# Patient Record
Sex: MALE | Race: WHITE | Employment: OTHER | URBAN - METROPOLITAN AREA
[De-identification: names, ages, dates, MRNs, and addresses within clinical notes are randomized per-mention and may not be internally consistent; named-entity substitution may affect disease eponyms.]

---

## 2018-11-16 ENCOUNTER — OFFICE VISIT (OUTPATIENT)
Dept: INTERNAL MEDICINE CLINIC | Age: 73
End: 2018-11-16

## 2018-11-16 VITALS
TEMPERATURE: 97.9 F | HEART RATE: 56 BPM | WEIGHT: 180 LBS | RESPIRATION RATE: 16 BRPM | OXYGEN SATURATION: 98 % | HEIGHT: 70 IN | BODY MASS INDEX: 25.77 KG/M2 | DIASTOLIC BLOOD PRESSURE: 84 MMHG | SYSTOLIC BLOOD PRESSURE: 130 MMHG

## 2018-11-16 DIAGNOSIS — G47.9 SLEEP DISTURBANCE: ICD-10-CM

## 2018-11-16 DIAGNOSIS — Z87.39 H/O: GOUT: ICD-10-CM

## 2018-11-16 DIAGNOSIS — I10 HYPERTENSION, ESSENTIAL: Primary | ICD-10-CM

## 2018-11-16 DIAGNOSIS — K21.9 GASTROESOPHAGEAL REFLUX DISEASE WITHOUT ESOPHAGITIS: ICD-10-CM

## 2018-11-16 RX ORDER — FEBUXOSTAT 40 MG/1
20 TABLET ORAL DAILY
COMMUNITY
Start: 2018-09-25 | End: 2019-03-25 | Stop reason: SDUPTHER

## 2018-11-16 RX ORDER — LISINOPRIL 20 MG/1
20 TABLET ORAL DAILY
COMMUNITY
Start: 2018-10-05 | End: 2019-03-25 | Stop reason: SDUPTHER

## 2018-11-16 RX ORDER — RANITIDINE HCL 75 MG
75 TABLET ORAL DAILY
COMMUNITY
End: 2019-12-10

## 2018-11-16 RX ORDER — BISMUTH SUBSALICYLATE 262 MG
1 TABLET,CHEWABLE ORAL DAILY
COMMUNITY

## 2018-11-16 RX ORDER — MELATONIN
DAILY
COMMUNITY

## 2018-11-16 NOTE — PROGRESS NOTES
Shanna Palencia is a 72y.o. year old male who is a new patient to me today (11/16/18). He was previous followed by PCP in [place]. 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 He goes by \"Brayden\". Assessment & Plan:  
Diagnoses and all orders for this visit: 
 
1. Hypertension, essential- new dx to me, chronic problem, well controlled, continue current treatment, will get previous PCP records & labs for review. 2. H/O: gout- new dx to me, chronic problem, asymptomatic, no medication changes pending review of PCP records/labs 3. Gastroesophageal reflux disease without esophagitis- new dx to me, chronic problem, well controlled, need to get previous PCP records, not sure he was ever symptomatic and just using for prophylaxis, since not a PPI will continue for this time. 4. Sleep disturbance- new dx in the last year, not ideally controlled, reviewed sleep hygiene, reviewed OTC and Rx meds I would recommend, will start w/ melatonin and consider trazadone. Follow-up Disposition: 
Return in about 4 months (around 3/16/2019) for FULL PHYSICAL - 30 minutes. Subjective:  
Ashvin Morton was seen today for Establish Care Cardiovascular Review The patient has hypertension. He has been on medication for 1-2 yrs. He reports taking medications as instructed, no medication side effects noted on a regular basis (rare cough). Diet and Lifestyle: generally follows a low fat low cholesterol diet, generally follows a low sodium diet, exercises regularly. Labs: no lab studies available for review at time of visit. He reports labs were checked 4 months ago. Endocrine Review He is seen for gout. He reports no recent flares. He reports medication compliance: compliant all of the time. Medication side effects: none. Diet and Lifestyle: generally follows a good diet. no lab studies available for review at time of visit. GI Review He has a history of GERD and hiatal hernia. He reports symptoms are well controlled and never really had symptoms. He was told he had a hiatal hernia. He denies: abdominal bloating, heartburn, midepigastric pain and nausea. Medical therapy currently involves Zantac. The following sections were reviewed & updated as appropriate: PMH, PL, PSH, FH, and SH. Patient Active Problem List  
Diagnosis Code  Hypertension, essential I10  
 Gastroesophageal reflux disease without esophagitis K21.9  
 H/O: gout Z87.39 Prior to Admission medications Medication Sig Start Date End Date Taking? Authorizing Provider ULORIC 40 mg tab tablet Take 20 mg by mouth daily. 9/25/18  Yes Provider, Historical  
lisinopril (PRINIVIL, ZESTRIL) 20 mg tablet Take 20 mg by mouth daily. 10/5/18  Yes Provider, Historical  
raNITIdine (ZANTAC) 75 mg tab Take 75 mg by mouth daily. Yes Provider, Historical  
multivitamin (ONE A DAY) tablet Take 1 Tab by mouth daily. Yes Provider, Historical  
Omega-3 Fatty Acids (FISH OIL) 500 mg cap Take 1 Cap by mouth daily. Yes Provider, Historical  
cholecalciferol (VITAMIN D3) 1,000 unit tablet Take  by mouth daily. Yes Provider, Historical  
  
 
 
Allergies no known allergies Review of Systems Constitutional: Negative for malaise/fatigue and weight loss. Respiratory: Negative for cough and shortness of breath. Cardiovascular: Negative for chest pain, palpitations and leg swelling. Gastrointestinal: Negative for abdominal pain, constipation, diarrhea, heartburn, nausea and vomiting. Genitourinary: Negative for frequency. Musculoskeletal: Negative for joint pain and myalgias. Skin: Negative for rash. Neurological: Negative for tingling, sensory change, focal weakness and headaches. Psychiatric/Behavioral: Negative for depression. The patient has insomnia. The patient is not nervous/anxious. Insomnia- he reports trouble staying asleep, no issues falling a sleep, has been an issue for the last 1-2 years. He has d/w his previous PCP and did not recommend any changes Objective:  
Physical Exam  
Constitutional: No distress. HENT:  
Mouth/Throat: Mucous membranes are normal.  
Eyes: Conjunctivae are normal. No scleral icterus. Neck: Neck supple. Cardiovascular: Regular rhythm and normal heart sounds. No murmur heard. Pulmonary/Chest: Effort normal and breath sounds normal. He has no wheezes. He has no rales. Abdominal: Soft. Bowel sounds are normal. He exhibits no mass. There is no hepatosplenomegaly. There is no tenderness. Musculoskeletal: He exhibits no edema. Lymphadenopathy:  
  He has no cervical adenopathy. Skin: No rash noted. Psychiatric: He has a normal mood and affect. His behavior is normal.  
  
 
 
Visit Vitals /84 Pulse (!) 56 Temp 97.9 °F (36.6 °C) (Oral) Resp 16 Ht 5' 9.5\" (1.765 m) Wt 180 lb (81.6 kg) SpO2 98% BMI 26.20 kg/m² Disclaimer: 
Advised him to call back or return to office if symptoms worsen/change/persist. 
Discussed expected course/resolution/complications of diagnosis in detail with patient. Medication risks/benefits/costs/interactions/alternatives discussed with patient. He was given an after visit summary which includes diagnoses, current medications, & vitals. He expressed understanding with the diagnosis and plan. Aspects of this note may have been generated using voice recognition software. Despite editing, there may be some syntax errors.   
 
 
Conchita Chapin MD

## 2019-03-25 ENCOUNTER — TELEPHONE (OUTPATIENT)
Dept: INTERNAL MEDICINE CLINIC | Age: 74
End: 2019-03-25

## 2019-03-25 RX ORDER — FEBUXOSTAT 40 MG/1
20 TABLET ORAL DAILY
Qty: 30 TAB | Refills: 0 | OUTPATIENT
Start: 2019-03-25

## 2019-03-25 RX ORDER — FEBUXOSTAT 40 MG/1
20 TABLET ORAL DAILY
Status: CANCELLED | OUTPATIENT
Start: 2019-03-25

## 2019-03-25 RX ORDER — LISINOPRIL 20 MG/1
20 TABLET ORAL DAILY
Qty: 90 TAB | Refills: 0 | Status: SHIPPED | OUTPATIENT
Start: 2019-03-25 | End: 2019-04-05 | Stop reason: SDUPTHER

## 2019-03-25 RX ORDER — FEBUXOSTAT 40 MG/1
20 TABLET ORAL DAILY
Qty: 45 TAB | Refills: 0 | Status: SHIPPED | OUTPATIENT
Start: 2019-03-25 | End: 2019-04-05 | Stop reason: SDUPTHER

## 2019-03-25 NOTE — TELEPHONE ENCOUNTER
Patient needs refills on uloric and lisinopril, reminded him he is due for an office visit with Dr. Richie Mcrae,. offered to set up something now he w/c/b. He is out of uloric.

## 2019-03-25 NOTE — TELEPHONE ENCOUNTER
3 months of meds sent in, no further refills until seen in office and needs labs. I have not received previous PCP records, please resent.

## 2019-03-28 ENCOUNTER — OFFICE VISIT (OUTPATIENT)
Dept: INTERNAL MEDICINE CLINIC | Age: 74
End: 2019-03-28

## 2019-03-28 VITALS
TEMPERATURE: 97.6 F | RESPIRATION RATE: 16 BRPM | SYSTOLIC BLOOD PRESSURE: 158 MMHG | HEIGHT: 70 IN | WEIGHT: 181 LBS | OXYGEN SATURATION: 98 % | HEART RATE: 59 BPM | BODY MASS INDEX: 25.91 KG/M2 | DIASTOLIC BLOOD PRESSURE: 82 MMHG

## 2019-03-28 DIAGNOSIS — I10 HYPERTENSION, ESSENTIAL: Primary | ICD-10-CM

## 2019-03-28 DIAGNOSIS — K40.90 LEFT GROIN HERNIA: ICD-10-CM

## 2019-03-28 DIAGNOSIS — Z23 ENCOUNTER FOR IMMUNIZATION: ICD-10-CM

## 2019-03-28 DIAGNOSIS — Z87.39 H/O: GOUT: ICD-10-CM

## 2019-03-28 DIAGNOSIS — Z11.59 NEED FOR HEPATITIS C SCREENING TEST: ICD-10-CM

## 2019-03-28 NOTE — PROGRESS NOTES
Lima Stephen is a 72 y.o. male who was seen in clinic today (3/28/2019). He RTC for 2nd visit, previous PCP records still have not been received. Assessment & Plan:  
 
Diagnoses and all orders for this visit: 
 
1. Hypertension, essential- previously well controlled, unclear why elevated now, has not been checking at home, he will start check amb BP and update me in 2-3 wks, will check labs and not making any changes at this time -     METABOLIC PANEL, COMPREHENSIVE 
-     CBC W/O DIFF 
 
2. H/O: gout- asymptomatic, well controlled, continue current treatment pending review of labs -     METABOLIC PANEL, COMPREHENSIVE 
-     CBC W/O DIFF 
-     URIC ACID 3. Left groin hernia- this is a new problem, he reports this is recurrent, nothing obvious on exam but is concerning, will have him see specialist, reviewed imaging but will defer at this time.  
-     [address] 4. Encounter for immunization 
-     varicella-zoster recombinant, PF, (SHINGRIX, PF,) 50 mcg/0.5 mL susr injection; 0.5 ml IM once and then repeat in 2-6 months. 
-     diph,Pertuss,Acell,,Tet Vac-PF (ADACEL) 2 Lf-(2.5-5-3-5 mcg)-5Lf/0.5 mL susp; 0.5 mL by IntraMUSCular route once for 1 dose. 5. Need for hepatitis C screening test 
-     HEPATITIS C AB Follow-up and Dispositions · Return in about 3 months (around 6/28/2019) for FULL PHYSICAL - 30 minutes. Subjective:  
Rosalva Maldonado was seen today for Hypertension and Gout Cardiovascular Review The patient has hypertension. Since last visit: no changes. He reports taking medications as instructed, no medication side effects noted, home BP monitoring in range of 954-920'L systolic over 76'H diastolic. Diet and Lifestyle: generally follows a low sodium diet, exercises regularly. Labs: no lab studies available for review at time of visit. Endocrine Review He is seen for gout. Since last visit: no recent flares.   He reports medication compliance: compliant all of the time. Medication side effects: none. Diet and Lifestyle: generally follows a good diet. no lab studies available for review at time of visit. Brief Labs:  
No results found for: NA, K, CREA, CREATEXT, CHOL, HDL, LDLC, LDL, LDLCEXT, TRIGL, HBA1C, ZZU0LAND, TSH, [id number], TSHEXT Prior to Admission medications Medication Sig Start Date End Date Taking? Authorizing Provider ULORIC 40 mg tab tablet Take 0.5 Tabs by mouth daily. 3/25/19  Yes Patricio Mckeon MD  
lisinopril (PRINIVIL, ZESTRIL) 20 mg tablet Take 1 Tab by mouth daily. 3/25/19  Yes Patricio Mckeon MD  
raNITIdine (ZANTAC) 75 mg tab Take 75 mg by mouth daily. Yes Provider, Historical  
multivitamin (ONE A DAY) tablet Take 1 Tab by mouth daily. Yes Provider, Historical  
Omega-3 Fatty Acids (FISH OIL) 500 mg cap Take 1 Cap by mouth daily. Yes Provider, Historical  
cholecalciferol (VITAMIN D3) 1,000 unit tablet Take  by mouth daily. Yes Provider, Historical  
  
 
 
No Known Allergies Review of Systems Constitutional: Negative for malaise/fatigue and weight loss. Respiratory: Negative for cough and shortness of breath. Cardiovascular: Negative for chest pain, palpitations and leg swelling. Gastrointestinal: Negative for abdominal pain, constipation, diarrhea, heartburn, nausea and vomiting. He reports h/o hernia repair in the past.  He has had this area repaired in the past, feeling sharp pain at times, similar to previous hernia pains, no obvious triggers Genitourinary: Negative for frequency. He thinks he is urinating more, he is not sure. He reports nocturia 2-3, leakage is stable. No urgency. He is s/p prostatectomy in early 90's Musculoskeletal: Negative for joint pain and myalgias. Skin: Negative for rash. Neurological: Negative for tingling, sensory change, focal weakness and headaches. Psychiatric/Behavioral: Negative for depression. The patient is not nervous/anxious and does not have insomnia. Objective:  
Physical Exam  
Cardiovascular: Regular rhythm and normal heart sounds. No murmur heard. Pulmonary/Chest: Effort normal and breath sounds normal. He has no wheezes. He has no rales. Abdominal: Soft. Bowel sounds are normal. He exhibits no mass. There is no hepatosplenomegaly. There is no tenderness. A hernia is present. Hernia confirmed negative in the left inguinal area. Genitourinary: Circumcised. Lymphadenopathy:  
     Left: No inguinal adenopathy present. Visit Vitals /82 Pulse (!) 59 Temp 97.6 °F (36.4 °C) (Oral) Resp 16 Ht 5' 9.5\" (1.765 m) Wt 181 lb (82.1 kg) SpO2 98% BMI 26.35 kg/m² Disclaimer: 
Advised him to call back or return to office if symptoms worsen/change/persist. 
Discussed expected course/resolution/complications of diagnosis in detail with patient. Medication risks/benefits/costs/interactions/alternatives discussed with patient. He was given an after visit summary which includes diagnoses, current medications, & vitals. He expressed understanding with the diagnosis and plan. Aspects of this note may have been generated using voice recognition software. Despite editing, there may be some syntax errors.   
 
 
Dee Arteaga MD

## 2019-03-29 LAB
ALBUMIN SERPL-MCNC: 4.5 G/DL (ref 3.5–4.8)
ALBUMIN/GLOB SERPL: 2 {RATIO} (ref 1.2–2.2)
ALP SERPL-CCNC: 53 IU/L (ref 39–117)
ALT SERPL-CCNC: 16 IU/L (ref 0–44)
AST SERPL-CCNC: 19 IU/L (ref 0–40)
BILIRUB SERPL-MCNC: 0.5 MG/DL (ref 0–1.2)
BUN SERPL-MCNC: 15 MG/DL (ref 8–27)
BUN/CREAT SERPL: 16 (ref 10–24)
CALCIUM SERPL-MCNC: 9.2 MG/DL (ref 8.6–10.2)
CHLORIDE SERPL-SCNC: 102 MMOL/L (ref 96–106)
CO2 SERPL-SCNC: 24 MMOL/L (ref 20–29)
CREAT SERPL-MCNC: 0.92 MG/DL (ref 0.76–1.27)
ERYTHROCYTE [DISTWIDTH] IN BLOOD BY AUTOMATED COUNT: 14.3 % (ref 12.3–15.4)
GLOBULIN SER CALC-MCNC: 2.2 G/DL (ref 1.5–4.5)
GLUCOSE SERPL-MCNC: 80 MG/DL (ref 65–99)
HCT VFR BLD AUTO: 40 % (ref 37.5–51)
HCV AB S/CO SERPL IA: <0.1 S/CO RATIO (ref 0–0.9)
HGB BLD-MCNC: 13.4 G/DL (ref 13–17.7)
MCH RBC QN AUTO: 30.9 PG (ref 26.6–33)
MCHC RBC AUTO-ENTMCNC: 33.5 G/DL (ref 31.5–35.7)
MCV RBC AUTO: 92 FL (ref 79–97)
PLATELET # BLD AUTO: 222 X10E3/UL (ref 150–379)
POTASSIUM SERPL-SCNC: 5.3 MMOL/L (ref 3.5–5.2)
PROT SERPL-MCNC: 6.7 G/DL (ref 6–8.5)
RBC # BLD AUTO: 4.33 X10E6/UL (ref 4.14–5.8)
SODIUM SERPL-SCNC: 140 MMOL/L (ref 134–144)
URATE SERPL-MCNC: 4.2 MG/DL (ref 3.7–8.6)
WBC # BLD AUTO: 4.5 X10E3/UL (ref 3.4–10.8)

## 2019-03-29 NOTE — PROGRESS NOTES
Letter sent to patient. All labs are stable or at goal for him. K just barely elevated, likely lisinopril related. No past labs to compare to at this time. No changes.

## 2019-04-03 ENCOUNTER — TELEPHONE (OUTPATIENT)
Dept: INTERNAL MEDICINE CLINIC | Age: 74
End: 2019-04-03

## 2019-04-03 RX ORDER — LISINOPRIL 20 MG/1
20 TABLET ORAL DAILY
Qty: 90 TAB | Refills: 0 | OUTPATIENT
Start: 2019-04-03

## 2019-04-03 RX ORDER — FEBUXOSTAT 40 MG/1
20 TABLET ORAL DAILY
Qty: 45 TAB | Refills: 0 | OUTPATIENT
Start: 2019-04-03

## 2019-04-05 RX ORDER — FEBUXOSTAT 40 MG/1
20 TABLET ORAL DAILY
Qty: 45 TAB | Refills: 0 | Status: SHIPPED | OUTPATIENT
Start: 2019-04-05 | End: 2019-06-24 | Stop reason: SDUPTHER

## 2019-04-05 RX ORDER — LISINOPRIL 20 MG/1
20 TABLET ORAL DAILY
Qty: 90 TAB | Refills: 0 | Status: SHIPPED | OUTPATIENT
Start: 2019-04-05 | End: 2019-12-18

## 2019-04-05 NOTE — TELEPHONE ENCOUNTER
Called and spoke with . Lindsay Goltz. Stated he did not receive a 90 script and does not know what happened. It appears that the pharmacy never r'cvd it. Informed him script was sent in again. (Lisinopril 20mg Qday and Uloric 40mg Qday.)  Confirmed that pharmacy was correct. ([facility] on [address] He was appreciative for the quick response.

## 2019-04-05 NOTE — TELEPHONE ENCOUNTER
Please call patient. meds were sent in back in March for 90 days, not sure what happened.   meds have been sent in again

## 2019-04-22 ENCOUNTER — OFFICE VISIT (OUTPATIENT)
Dept: SURGERY | Age: 74
End: 2019-04-22

## 2019-04-22 VITALS
HEART RATE: 59 BPM | RESPIRATION RATE: 18 BRPM | TEMPERATURE: 98.3 F | OXYGEN SATURATION: 97 % | HEIGHT: 70 IN | WEIGHT: 182 LBS | SYSTOLIC BLOOD PRESSURE: 160 MMHG | DIASTOLIC BLOOD PRESSURE: 82 MMHG | BODY MASS INDEX: 26.05 KG/M2

## 2019-04-22 DIAGNOSIS — K40.90 LEFT INGUINAL HERNIA: Primary | ICD-10-CM

## 2019-04-22 NOTE — PROGRESS NOTES
1. Have you been to the ER, urgent care clinic since your last visit? Hospitalized since your last visit? No 
 
2. Have you seen or consulted any other health care providers outside of the [address] [place] [address] since your last visit? Include any pap smears or colon screening.  No

## 2019-04-23 NOTE — PROGRESS NOTES
[employer] General Surgery History and Physical 
 
History of Present Illness:  
  
Jessy Carmichael is a 72 y.o. male who has a new left inguinal hernia. He has had a few hernia repairs on the R side and he cannot remember if he had one repaired on the left or not. He has been having some pain in the L groin and noticed a small bulge in the L side. He does not have any changes in BM. He pain he has is minimal a 1 of 10. Past Medical History:  
Diagnosis Date  Gastroesophageal reflux disease without esophagitis 11/16/2018  H/O: gout 11/16/2018  Hypertension, essential 11/16/2018 Past Surgical History:  
Procedure Laterality Date  HX COLONOSCOPY    
 HX HERNIA REPAIR Bilateral 2000  
 bilateral inguinal x 4  
 HX PARATHYROIDECTOMY [address] [facility], [address] Current Outpatient Medications:  
  lisinopril (PRINIVIL, ZESTRIL) 20 mg tablet, Take 1 Tab by mouth daily. , Disp: 90 Tab, Rfl: 0 
  ULORIC 40 mg tab tablet, Take 0.5 Tabs by mouth daily. , Disp: 45 Tab, Rfl: 0 
  varicella-zoster recombinant, PF, (SHINGRIX, PF,) 50 mcg/0.5 mL susr injection, 0.5 ml IM once and then repeat in 2-6 months., Disp: 0.5 mL, Rfl: 1   raNITIdine (ZANTAC) 75 mg tab, Take 75 mg by mouth daily. , Disp: , Rfl:  
  multivitamin (ONE A DAY) tablet, Take 1 Tab by mouth daily. , Disp: , Rfl:  
  Omega-3 Fatty Acids (FISH OIL) 500 mg cap, Take 1 Cap by mouth daily. , Disp: , Rfl:  
  cholecalciferol (VITAMIN D3) 1,000 unit tablet, Take  by mouth daily. , Disp: , Rfl:  
 
No Known Allergies Social History Socioeconomic History  Marital status:  Spouse name: Not on file  Number of children: Not on file  Years of education: Not on file  Highest education level: Not on file Occupational History  Not on file Social Needs  Financial resource strain: Not on file  Food insecurity:  
  Worry: Not on file Inability: Not on file  Transportation needs: Medical: Not on file Non-medical: Not on file Tobacco Use  Smoking status: Former Smoker Types: Cigarettes Last attempt to quit: 1978 Years since quittin.3  Smokeless tobacco: Never Used Substance and Sexual Activity  Alcohol use: Yes Alcohol/week: 4.2 oz Types: 7 Glasses of wine per week Frequency: 4 or more times a week Drinks per session: 1 or 2 Binge frequency: Never  Drug use: Never  Sexual activity: Not Currently Lifestyle  Physical activity:  
  Days per week: Not on file Minutes per session: Not on file  Stress: Not on file Relationships  Social connections:  
  Talks on phone: Not on file Gets together: Not on file Attends Taoism service: Not on file Active member of club or organization: Not on file Attends meetings of clubs or organizations: Not on file Relationship status: Not on file  Intimate partner violence:  
  Fear of current or ex partner: Not on file Emotionally abused: Not on file Physically abused: Not on file Forced sexual activity: Not on file Other Topics Concern  Not on file Social History Narrative  Not on file Family History Problem Relation Age of Onset  Heart Disease Father  No Known Problems Sister  Heart Failure Brother  Bipolar Disorder Daughter  No Known Problems Son   
 
 
ROS Constitutional: negative Ears, Nose, Mouth, Throat, and Face: negative Respiratory: negative Cardiovascular: negative Gastrointestinal: left groin pain Genitourinary:negative Integument/Breast: negative Hematologic/Lymphatic: negative Behavioral/Psychiatric: negative Allergic/Immunologic: negative Physical Exam:  
 
Visit Vitals /82 (BP 1 Location: Left arm, BP Patient Position: Sitting) Pulse (!) 59 Temp 98.3 °F (36.8 °C) (Oral) Resp 18 Ht 5' 9.5\" (1.765 m) Wt 182 lb (82.6 kg) SpO2 97% BMI 26.49 kg/m² General - alert and oriented, no apparent distress HEENT - no jaundice, no hearing imparement Pulm - CTAB, no C/W/R 
CV - RRR, no M/R/G Abd - soft, ND, BS present, left inguinal hernia present, soft small and reducible, no scar in the L groin, scar present in the R groin Ext - pulses intact in UE and LE bilaterally, no edema Skin - supple, no rashes Psychiatric - normal affect, good mood Labs 
none Imaging 
none I have reviewed and agree with all of the pertinent images Assessment:  
 
Norman Agee is a 72 y.o. male with left inguinal hernia Recommendations: 1. He does have a left inguinal hernia that is small. He has had a previous prostatectomy and would be better off with an open operation. He is not sure if he had a hernia repair on the L side but I do not see any scars. He will need open repair with mesh in the OR. I have discussed the above procedure with the patient in detail. We reviewed the benefits and possible complications of the surgery which include bleeding, infection, damage to adjacent organs, venous thromboembolism, need for repeat surgery, death and other unforseen complications. The patient agreed to proceed with the surgery. Tavares Castillo MD 
 
Mr. Efraín Belcher has a reminder for a \"due or due soon\" health maintenance. I have asked that he contact his primary care provider for follow-up on this health maintenance.

## 2019-05-08 RX ORDER — FEBUXOSTAT 40 MG/1
20 TABLET ORAL DAILY
Qty: 45 TAB | Refills: 0 | OUTPATIENT
Start: 2019-05-08

## 2019-06-24 RX ORDER — FEBUXOSTAT 40 MG/1
TABLET ORAL
Qty: 45 TAB | Refills: 1 | Status: SHIPPED | OUTPATIENT
Start: 2019-06-24 | End: 2019-07-20 | Stop reason: SDUPTHER

## 2019-07-05 ENCOUNTER — OFFICE VISIT (OUTPATIENT)
Dept: INTERNAL MEDICINE CLINIC | Age: 74
End: 2019-07-05

## 2019-07-05 VITALS
OXYGEN SATURATION: 98 % | HEART RATE: 64 BPM | WEIGHT: 182 LBS | HEIGHT: 70 IN | TEMPERATURE: 97.7 F | SYSTOLIC BLOOD PRESSURE: 138 MMHG | BODY MASS INDEX: 26.05 KG/M2 | DIASTOLIC BLOOD PRESSURE: 82 MMHG | RESPIRATION RATE: 16 BRPM

## 2019-07-05 DIAGNOSIS — Z23 ENCOUNTER FOR IMMUNIZATION: ICD-10-CM

## 2019-07-05 DIAGNOSIS — I10 HYPERTENSION, ESSENTIAL: ICD-10-CM

## 2019-07-05 DIAGNOSIS — K40.90 LEFT INGUINAL HERNIA: ICD-10-CM

## 2019-07-05 DIAGNOSIS — Z13.6 SCREENING FOR CARDIOVASCULAR CONDITION: ICD-10-CM

## 2019-07-05 DIAGNOSIS — M54.2 NECK PAIN: ICD-10-CM

## 2019-07-05 DIAGNOSIS — M25.552 LEFT HIP PAIN: ICD-10-CM

## 2019-07-05 DIAGNOSIS — K21.9 GASTROESOPHAGEAL REFLUX DISEASE WITHOUT ESOPHAGITIS: ICD-10-CM

## 2019-07-05 DIAGNOSIS — Z12.11 COLON CANCER SCREENING: ICD-10-CM

## 2019-07-05 DIAGNOSIS — Z87.39 H/O: GOUT: ICD-10-CM

## 2019-07-05 DIAGNOSIS — Z71.89 ADVANCED CARE PLANNING/COUNSELING DISCUSSION: ICD-10-CM

## 2019-07-05 DIAGNOSIS — Z00.00 ROUTINE PHYSICAL EXAMINATION: Primary | ICD-10-CM

## 2019-07-05 RX ORDER — NEBIVOLOL 5 MG/1
5 TABLET ORAL DAILY
Qty: 21 TAB | Refills: 0 | Status: SHIPPED | COMMUNITY
Start: 2019-07-05 | End: 2019-12-10

## 2019-07-05 NOTE — PROGRESS NOTES
CPE     Samples dispensed, patient reminded to call to report how he is doing and BP readings. Violeta Green is a 72 y.o. male  who present for routine immunizations. he  denies any symptoms , reactions or allergies that would exclude them from being immunized today. Risks and adverse reactions were discussed and the VIS was given to them. All questions were addressed. he was observed for 5 min post injection. There were no reactions observed. Lauren Sanchez RN     Colonoscopy fast tracked to Dr. Harini Rascon, confirmation received.

## 2019-07-05 NOTE — ACP (ADVANCE CARE PLANNING)
Advance Care Planning (ACP) Provider Note - Comprehensive     Date of ACP Conversation: 07/05/19  Persons included in Conversation:  patient  Length of ACP Conversation in minutes: <16 minutes (Non-Billable)    Authorized Decision Maker (if patient is incapable of making informed decisions): Other Legally Authorized Decision Maker (e.g. Next of Kin)      He has NO advanced directive  - add't info provided. Reviewed DNR/DNI and patient is not interested. General ACP for ALL Patients with Decision Making Capacity:  Importance of advance care planning, including choosing a healthcare agent to communicate patient's healthcare decisions if patient lost the ability to make decisions, such as after a sudden illness or accident  Understanding of the healthcare agent role was assessed and information provided  Opportunity offered to explore how cultural, Anabaptist, spiritual, or personal beliefs would affect decisions for future care  Exploration of values, goals, and preferences if recovery is not expected, even with continued medical treatment in the event of: Imminent death or severe, permanent brain injury    For Serious or Chronic Illness:  Understanding of CPR, goals and expected outcomes, benefits and burdens discussed. Understanding of medical condition  Information on CPR success rates provided (e.g. for CPR in hospital, survival to d/c at two weeks is 22%, for chronically ill or elderly/frail survival is less than 3%)    Interventions Provided:  Recommended communicating the plan and making copies for the healthcare agent, personal physician, and others as appropriate (e.g., health system)  Recommended review of completed ACP document annually or upon change in health status       ====Honoring Choices Invitation====    Patient was invited to [facility] on this date and given the information folder for review.     Recommended appointment with [facility] Choices facilitator for ACP conversation regarding advance directives. [] Yes  [x] No  Referral sent to [facility] Choices team member or Coordinator for follow-up    [] Yes  [x] No  Patient scheduled an appointment. Site of Referral (type it here):   [facility]    ==============================

## 2019-07-05 NOTE — PROGRESS NOTES
Rajiv Armenta is a 72 y.o. male who was seen in clinic today (7/5/2019) for a full physical.          Assessment & Plan:   Diagnoses and all orders for this visit:    1. Routine physical examination  -     METABOLIC PANEL, COMPREHENSIVE  -     CBC W/O DIFF  -     PSA, DIAGNOSTIC (PROSTATE SPECIFIC AG)  -     LIPID PANEL    2. Advanced care planning/counseling discussion  -     FULL CODE    3. Encounter for immunization  -     varicella-zoster recombinant, PF, (SHINGRIX, PF,) 50 mcg/0.5 mL susr injection; 0.5 ml IM once and then repeat in 2-6 months. -     TETANUS, DIPHTHERIA TOXOIDS AND ACELLULAR PERTUSSIS VACCINE (TDAP), IN INDIVIDS. >=7, IM  -     MD IMMUNIZ ADMIN,1 SINGLE/COMB VAC/TOXOID    4. Colon cancer screening  -     REFERRAL TO GASTROENTEROLOGY    5. Screening for cardiovascular condition  -     US EXAM SCREENING AAA; Future    6. Hypertension, essential- well controlled, continue current treatment except will stop lisinopril x 3 wks and use Bystolic to see if any change in the cough, he is checking BP at home regularly and will update me in 2-3 wks. -     AMB POC EKG ROUTINE W/ 12 LEADS, INTER & REP  -     nebivolol (BYSTOLIC) 5 mg tablet; Take 1 Tab by mouth daily. 7. Gastroesophageal reflux disease without esophagitis- stable, continue current treatment     8. H/O: gout- well controlled, he is taking his medication(s) as directed & without any side effects, continue current treatment, previous labs reviewed. 9. Left inguinal hernia- he reports stable worse, getting bigger, he did see Dr Palmer Ask but wants a second opinion, information provided below  -     [address]    10. Left hip pain- this is a chronic problem but new to me, symptoms are: fluctuating, differential dx reviewed with the patient, exact etiology is unclear at this time. Favor more tendonitis/bursitis then OA. Will work on decreasing exercise x 1 wk and stretching. Will re-evaluate.   Reviewed when to consider imaging. Red flags were reviewed with the patient to RTC or notify me, expected time course for resolution reviewed. See AVS.     11. Neck pain- this is a new problem, symptoms are: improved, differential dx reviewed with the patient, favor muscular. Red flags and expectations were reviewed & discussed with the him. He verbalized understanding. Follow-up and Dispositions    · Return in about 1 year (around 7/5/2020), or if symptoms worsen or fail to improve, for FULL PHYSICAL - 30 minutes. ------------------------------------------------------------------------------------------    Subjective:   Routine Physical Exam    End of Life Planning: This was discussed with him today and he has NO advanced directive  - add't info provided. Reviewed DNR/DNI and patient is not interested. Depression Screen:   3 most recent PHQ Screens 4/22/2019   Little interest or pleasure in doing things Not at all   Feeling down, depressed, irritable, or hopeless Not at all   Total Score PHQ 2 0       Fall Risk:   Fall Risk Assessment, last 12 mths 4/22/2019   Able to walk? Yes   Fall in past 12 months? No       Abuse Screen:  Abuse Screening Questionnaire 7/5/2019   Do you ever feel afraid of your partner? N   Are you in a relationship with someone who physically or mentally threatens you? N   Is it safe for you to go home? Y         Alcohol Risk Factor Screening: On any occasion during the past 3 months, have you had more than 4 drinks containing alcohol? No  Do you average more than 14 drinks per week? No    Hearing Loss: Hearing is good. Cognition Screen:  Has your family/caregiver stated any concerns about your memory: no  Cognition: appears appropriate for age attention/concentration and appears appropriate safety awareness    Activities of Daily Living:    Requires assistance with: no ADLs  Home contains: no safety equipment.   Exercise: very active    Adult Nutrition Screen:  No risk factors noted.      Health Maintenance  Daily Aspirin: no  AAA Screening: order placed  Glaucoma Screening: Records requested      Immunizations:    Influenza: up to date   Tetanus: not up to date - will do today   Shingles: due for Shingrix - script provided   Pneumonia: will need Pneumovax after 11/19  Cancer screening:   Prostate: guidelines reviewed, s/p prostectomy, will check labs  Colon: guidelines reviewed, not up to date - [place]+ yrs [place], will get him set up w/ local provider        Patient Care Team:  Brenden Nazario MD as PCP - General (Internal Medicine)  Verna King MD as Surgeon (General Surgery)       The following sections were reviewed & updated as appropriate: PMH, PSH, FH, and SH. Patient Active Problem List   Diagnosis Code    Hypertension, essential I10    Gastroesophageal reflux disease without esophagitis K21.9    H/O: gout Z87.39    Left inguinal hernia K40.90          Prior to Admission medications    Medication Sig Start Date End Date Taking? Authorizing Provider   ULORIC 40 mg tab tablet TAKE 1/2 TABLET TABS BY MOUTH DAILY. 6/24/19  Yes Brenden Nazario MD   lisinopril (PRINIVIL, ZESTRIL) 20 mg tablet Take 1 Tab by mouth daily. 4/5/19  Yes Brenden Nazario MD   raNITIdine (ZANTAC) 75 mg tab Take 75 mg by mouth daily. Yes Provider, Historical   multivitamin (ONE A DAY) tablet Take 1 Tab by mouth daily. Yes Provider, Historical   Omega-3 Fatty Acids (FISH OIL) 500 mg cap Take 1 Cap by mouth daily. Yes Provider, Historical   cholecalciferol (VITAMIN D3) 1,000 unit tablet Take  by mouth daily. Yes Provider, Historical          No Known Allergies           Review of Systems   Constitutional: Negative for chills and fever. Respiratory: Positive for cough. Negative for shortness of breath. Cardiovascular: Negative for chest pain and palpitations.    Gastrointestinal: Negative for abdominal pain, blood in stool, constipation, diarrhea, heartburn, nausea and vomiting. Genitourinary:        He reports: nocturia x 1-2, urinary frequency. He denies: urinary hesitancy, weak stream.       Reports trouble getting an erection or maintaining an erection. Reports trouble with AM erections. Musculoskeletal: Positive for joint pain and neck pain. Negative for myalgias. L hip, has been present for 1-2 years, he reports walking 4-6 mile/day, improved w/ resting, worse at night (lays on either L or R side), no h/o trauma       Neck pain, has been on/off, a few weeks ago, it is improving, worse w/ flexion, sharp, no radiation, bilateral   Neurological: Negative for tingling, focal weakness and headaches. Endo/Heme/Allergies: Does not bruise/bleed easily. Psychiatric/Behavioral: Negative for depression. The patient is not nervous/anxious and does not have insomnia. Objective:   Physical Exam   Constitutional: He appears well-developed. No distress. HENT:   Right Ear: Tympanic membrane, external ear and ear canal normal.   Left Ear: Tympanic membrane, external ear and ear canal normal.   Nose: Nose normal.   Mouth/Throat: Uvula is midline, oropharynx is clear and moist and mucous membranes are normal. No posterior oropharyngeal erythema. Right ear is: 50% occluded with hard cerumen. Left ear is: 50% occluded with hard cerumen. Eyes: Conjunctivae and lids are normal. No scleral icterus. Neck: Neck supple. No thyromegaly present. Cardiovascular: Regular rhythm and normal heart sounds. No murmur heard. Pulmonary/Chest: Effort normal and breath sounds normal. He has no wheezes. He has no rales. Abdominal: Soft. Bowel sounds are normal. He exhibits no mass. There is no hepatosplenomegaly. There is no tenderness. Musculoskeletal: He exhibits no edema. Cervical back: He exhibits normal range of motion (but some pain w/ full rotation in both directions & flexion), no tenderness and no bony tenderness.         Thoracic back: He exhibits no bony tenderness. Lumbar back: Normal.   Lymphadenopathy:     He has no cervical adenopathy. Neurological: He has normal strength. No sensory deficit. Skin: No rash noted. Psychiatric: He has a normal mood and affect. His behavior is normal.          Visit Vitals  /82   Pulse 64   Temp 97.7 °F (36.5 °C) (Oral)   Resp 16   Ht 5' 9.5\" (1.765 m)   Wt 182 lb (82.6 kg)   SpO2 98%   BMI 26.49 kg/m²          Advised him to call back or return to office if symptoms worsen/change/persist.  Discussed expected course/resolution/complications of diagnosis in detail with patient. Medication risks/benefits/costs/interactions/alternatives discussed with patient. He was given an after visit summary which includes diagnoses, current medications, & vitals. He expressed understanding with the diagnosis and plan. Aspects of this note may have been generated using voice recognition software. Despite editing, there may be some syntax errors.        Rocky Lorenzo MD

## 2019-07-05 NOTE — PATIENT INSTRUCTIONS
Well Visit, Over 72: Care Instructions  Your Care Instructions    Physical exams can help you stay healthy. Your doctor has checked your overall health and may have suggested ways to take good care of yourself. He or she also may have recommended tests. At home, you can help prevent illness with healthy eating, regular exercise, and other steps. Follow-up care is a key part of your treatment and safety. Be sure to make and go to all appointments, and call your doctor if you are having problems. It's also a good idea to know your test results and keep a list of the medicines you take. How can you care for yourself at home? · Reach and stay at a healthy weight. This will lower your risk for many problems, such as obesity, diabetes, heart disease, and high blood pressure. · Get at least 30 minutes of exercise on most days of the week. Walking is a good choice. You also may want to do other activities, such as running, swimming, cycling, or playing tennis or team sports. · Do not smoke. Smoking can make health problems worse. If you need help quitting, talk to your doctor about stop-smoking programs and medicines. These can increase your chances of quitting for good. · Protect your skin from too much sun. When you're outdoors from 10 a.m. to 4 p.m., stay in the shade or cover up with clothing and a hat with a wide brim. Wear sunglasses that block UV rays. Even when it's cloudy, put broad-spectrum sunscreen (SPF 30 or higher) on any exposed skin. · See a dentist one or two times a year for checkups and to have your teeth cleaned. · Wear a seat belt in the car. · Limit alcohol to 2 drinks a day for men and 1 drink a day for women. Too much alcohol can cause health problems. Follow your doctor's advice about when to have certain tests. These tests can spot problems early. For men and women  · Cholesterol.  Your doctor will tell you how often to have this done based on your overall health and other things that can increase your risk for heart attack and stroke. · Blood pressure. Have your blood pressure checked during a routine doctor visit. Your doctor will tell you how often to check your blood pressure based on your age, your blood pressure results, and other factors. · Diabetes. Ask your doctor whether you should have tests for diabetes. · Vision. Experts recommend that you have yearly exams for glaucoma and other age-related eye problems. · Hearing. Tell your doctor if you notice any change in your hearing. You can have tests to find out how well you hear. · Colon cancer tests. Keep having colon cancer tests as your doctor recommends. You can have one of several types of tests. · Heart attack and stroke risk. At least every 4 to 6 years, you should have your risk for heart attack and stroke assessed. Your doctor uses factors such as your age, blood pressure, cholesterol, and whether you smoke or have diabetes to show what your risk for a heart attack or stroke is over the next 10 years. · Osteoporosis. Talk to your doctor about whether you should have a bone density test to find out whether you have thinning bones. Also ask your doctor about whether you should take calcium and vitamin D supplements. For women  · Pap test and pelvic exam. You may no longer need a Pap test. Talk with your doctor about whether to stop or continue to have Pap tests. · Breast exam and mammogram. Ask how often you should have a mammogram, which is an X-ray of your breasts. A mammogram can spot breast cancer before it can be felt and when it is easiest to treat. · Thyroid disease. Talk to your doctor about whether to have your thyroid checked as part of a regular physical exam. Women have an increased chance of a thyroid problem. For men  · Prostate exam. Talk to your doctor about whether you should have a blood test (called a PSA test) for prostate cancer.  Experts disagree on whether men should have this test. Some experts recommend that you discuss the benefits and risks of the test with your doctor. · Abdominal aortic aneurysm. Ask your doctor whether you should have a test to check for an aneurysm. You may need a test if you ever smoked or if your parent, brother, sister, or child has had an aneurysm. When should you call for help? Watch closely for changes in your health, and be sure to contact your doctor if you have any problems or symptoms that concern you. Where can you learn more? Go to http://jose angel-jorden.info/. Enter C356 in the search box to learn more about \"Well Visit, Over 65: Care Instructions. \"  Current as of: March 28, 2018  Content Version: 11.9  © 1206-9316 [employer]. Care instructions adapted under license by [employer] (which disclaims liability or warranty for this information). If you have questions about a medical condition or this instruction, always ask your healthcare professional. [place] [address] any warranty or liability for your use of this information. Hip Flexor Strain: Rehab Exercises  Your Care Instructions  Here are some examples of typical rehabilitation exercises for your condition. Start each exercise slowly. Ease off the exercise if you start to have pain. Your doctor or physical therapist will tell you when you can start these exercises and which ones will work best for you. How to do the exercises  Pelvic tilt with marching    1. Lie on your back with your knees bent and your feet flat on the floor. 2. Tighten your belly muscles and buttocks, and press your lower back to the floor. 3. Keeping your knees bent, lift and then lower one leg up off the floor, and then lift and lower your other leg like you are marching. Each time you lift your leg, hold that position for about 6 seconds before lowering your leg. 4. Repeat 8 to 12 times. Scissors    1.  Lie on your back with your knees bent at a 90-degree angle and your feet off the floor. 2. Tighten your belly muscles and buttocks, and press your lower back to the floor. 3. Slowly straighten one leg, and hold that position for about 6 seconds. Your leg should be about 12 inches off the floor. Bring that leg back to the starting position, and then straighten your other leg. Hold that position for about 6 seconds, and then switch legs again. 4. Repeat 8 to 12 times. Hamstring stretch (lying down)    1. Lie flat on your back with your legs straight. If you feel discomfort in your back, place a small towel roll under your lower back. 2. Holding the back of your affected leg for support, lift your leg straight up and toward your body until you feel a stretch at the back of your thigh. 3. Hold the stretch for at least 30 seconds. 4. Repeat 2 to 4 times. Quadricep and hip flexor stretch (lying on side)    1. Lie on your side with your good leg flat on the floor and your hand supporting your head. 2. Bend your top leg, and reach behind you to grab the front of that foot or ankle with your other hand. 3. Stretch your leg back by pulling your foot toward your buttock. You will feel the stretch in the front of your thigh. If this causes stress on your knee, do not do this stretch. 4. Hold the stretch for at least 15 to 30 seconds. 5. Repeat 2 to 4 times. Hip flexor stretch (kneeling)    1. Kneel on your affected leg and bend your good leg out in front of you, with that foot flat on the floor. If you feel discomfort in the front of your knee, place a towel under your knee. 2. Keeping your back straight, slowly push your hips forward until you feel a stretch in the upper thigh of your back leg and hip. 3. Hold the stretch for at least 15 to 30 seconds. 4. Repeat 2 to 4 times. Hip flexor stretch (edge of table)    1. Lie flat on your back on a table or flat bench, with your knees and lower legs hanging off the edge of the table.   2. Grab your good leg at the knee, and pull that knee back toward your chest. Relax your affected leg and let it hang down toward the floor until you feel a stretch in the upper thigh of your affected leg and hip. 3. Hold the stretch for at least 15 to 30 seconds. 4. Repeat 2 to 4 times. Follow-up care is a key part of your treatment and safety. Be sure to make and go to all appointments, and call your doctor if you are having problems. It's also a good idea to know your test results and keep a list of the medicines you take. Where can you learn more? Go to http://jose angel-jorden.info/. Enter K543 in the search box to learn more about \"Hip Flexor Strain: Rehab Exercises. \"  Current as of: September 20, 2018  Content Version: 11.9  © 1795-2634 [facility] [employer]. Care instructions adapted under license by [employer] (which disclaims liability or warranty for this information). If you have questions about a medical condition or this instruction, always ask your healthcare professional. [address] any warranty or liability for your use of this information.

## 2019-07-17 ENCOUNTER — HOSPITAL ENCOUNTER (OUTPATIENT)
Dept: ULTRASOUND IMAGING | Age: 74
Discharge: HOME | End: 2019-07-17
Attending: INTERNAL MEDICINE
Payer: OTHER GOVERNMENT

## 2019-07-17 DIAGNOSIS — Z13.6 SCREENING FOR CARDIOVASCULAR CONDITION: ICD-10-CM

## 2019-07-17 PROCEDURE — 76706 US ABDL AORTA SCREEN AAA: CPT

## 2019-07-21 RX ORDER — FEBUXOSTAT 40 MG/1
TABLET ORAL
Qty: 45 TAB | Refills: 1 | Status: SHIPPED | OUTPATIENT
Start: 2019-07-21 | End: 2019-09-25

## 2019-09-24 ENCOUNTER — TELEPHONE (OUTPATIENT)
Dept: INTERNAL MEDICINE CLINIC | Age: 74
End: 2019-09-24

## 2019-09-24 NOTE — TELEPHONE ENCOUNTER
[phone]      Please call to switch script Uloric to  Allopurinol . The Allopurinol  Would cost a lot less.

## 2019-09-25 RX ORDER — COLCHICINE 0.6 MG/1
0.6 CAPSULE ORAL DAILY
Qty: 30 CAP | Refills: 0 | Status: SHIPPED | OUTPATIENT
Start: 2019-09-25 | End: 2019-12-10

## 2019-09-25 RX ORDER — ALLOPURINOL 300 MG/1
300 TABLET ORAL DAILY
Qty: 90 TAB | Refills: 1 | Status: SHIPPED | OUTPATIENT
Start: 2019-09-25 | End: 2019-09-27 | Stop reason: ALTCHOICE

## 2019-09-25 NOTE — TELEPHONE ENCOUNTER
Let patient know that we are switching him to allopurinol due to the cost of uloric. Patient will also take colchicine for 30 days while switching medications to reduce a gout flare up.

## 2019-09-25 NOTE — TELEPHONE ENCOUNTER
I gave it to him before, I thought he had taken it. Yes, for a short time it is a good medication. Not good long term. I would recommend as changing the dose of allopurinol can induce a gout flare. This is designed to prevent that from happening. He does not have to take it but it would be my recommendation.

## 2019-09-25 NOTE — TELEPHONE ENCOUNTER
Okay to change from Uloric or Allopurinol 300mg, 1 tab PO daily, #90, RF 1. Notify him this is not a 1:1 conversation. Would start on colchicine 0.6mg, 1 tab PO daily #30, RF 0 while making the change to prevent a gout flare.

## 2019-09-27 RX ORDER — FEBUXOSTAT 40 MG/1
40 TABLET, FILM COATED ORAL DAILY
Qty: 90 TAB | Refills: 1 | Status: SHIPPED | OUTPATIENT
Start: 2019-09-27 | End: 2020-02-05 | Stop reason: SDUPTHER

## 2019-09-27 NOTE — TELEPHONE ENCOUNTER
Patient now requesting to send in febuxostat the generic form of uloric, states the pharmacist confirmed they have it in stock and will only cost him $20 vs $320. Per verbal order Dr. South Rodriguez, sent to pharmacy.

## 2019-12-10 ENCOUNTER — OFFICE VISIT (OUTPATIENT)
Dept: INTERNAL MEDICINE CLINIC | Age: 74
End: 2019-12-10

## 2019-12-10 VITALS
SYSTOLIC BLOOD PRESSURE: 164 MMHG | BODY MASS INDEX: 26.57 KG/M2 | TEMPERATURE: 97.6 F | WEIGHT: 185.6 LBS | OXYGEN SATURATION: 98 % | RESPIRATION RATE: 16 BRPM | DIASTOLIC BLOOD PRESSURE: 86 MMHG | HEART RATE: 59 BPM | HEIGHT: 70 IN

## 2019-12-10 DIAGNOSIS — I80.01 THROMBOPHLEBITIS OF SUPERFICIAL VEINS OF RIGHT LOWER EXTREMITY: Primary | ICD-10-CM

## 2019-12-10 RX ORDER — DICLOFENAC SODIUM 75 MG/1
75 TABLET, DELAYED RELEASE ORAL 2 TIMES DAILY
Qty: 14 TAB | Refills: 0 | Status: SHIPPED | OUTPATIENT
Start: 2019-12-10 | End: 2019-12-17

## 2019-12-10 NOTE — PROGRESS NOTES
Pt states has had small knot just below right knee x2 weeks. Tender to touch. Noted area redden. Here for further evaluation.

## 2019-12-10 NOTE — PATIENT INSTRUCTIONS
Superficial Thrombophlebitis: Care Instructions Your Care Instructions Superficial thrombophlebitis is inflammation in a vein where a blood clot has formed close to the surface of the skin. You may be able to feel the clot as a firm lump under the skin. The skin over the clot can become red, tender, and warm to the touch. Blood clots in veins close to the skin's surface usually are not serious and often can be treated at home. Sometimes superficial thrombophlebitis spreads to a deeper vein (deep vein thrombosis, or DVT). These deeper clots can be serious, even life-threatening. It is very important that you follow your doctor's instructions, keep all follow-up appointments, and watch for new or worsening symptoms of a clot. Follow-up care is a key part of your treatment and safety. Be sure to make and go to all appointments, and call your doctor if you are having problems. It's also a good idea to know your test results and keep a list of the medicines you take. How can you care for yourself at home? · Take your medicines exactly as prescribed. Call your doctor if you think you are having a problem with your medicine. You will get more details on the specific medicines your doctor prescribes. · Prop up the sore leg or arm on a pillow anytime you sit or lie down. Try to keep it above the level of your heart. To prevent thrombophlebitis · Exercise. Keep blood moving in your legs to keep new clots from forming. · Get up out of bed as soon as possible after an illness or surgery. · Do not smoke. If you need help quitting, talk to your doctor about stop-smoking programs and medicines. These can increase your chances of quitting for good. · Ask your doctor about compression stockings. These may help prevent blood clots from forming in your legs. But there are different types of stockings, and they need to fit right. So your doctor will recommend what you need. When should you call for help? Call 911 anytime you think you may need emergency care. For example, call if: 
  · You have sudden chest pain and shortness of breath, or you cough up blood.  
  · You vomit blood or what looks like coffee grounds.  
  · You pass maroon or very bloody stools.  
  · You passed out (lost consciousness).  
 Call your doctor now or seek immediate medical care if: 
  · You have signs of a blood clot, such as: 
? Pain in your calf, back of the knee, thigh, or groin. ? Redness and swelling in your leg or groin.  
  · You notice a new hard, red, or tender area in your leg.  
  · Your stools are black and tarlike or have streaks of blood.  
 Watch closely for changes in your health, and be sure to contact your doctor if: 
  · You do not get better as expected. Where can you learn more? Go to http://jose angel-jorden.info/. Enter [phone] in the search box to learn more about \"Superficial Thrombophlebitis: Care Instructions. \" Current as of: September 26, 2018 Content Version: 12.2 © 9469-3368 [employer]. Care instructions adapted under license by [employer] (which disclaims liability or warranty for this information). If you have questions about a medical condition or this instruction, always ask your healthcare professional. [address] any warranty or liability for your use of this information.

## 2019-12-10 NOTE — PROGRESS NOTES
HISTORY OF PRESENT ILLNESS  Oriana Pacheco is a 69 y.o. male. Leg Pain    The history is provided by the patient. This is a new problem. Episode onset: 2 wks. The problem occurs constantly. The problem has not changed since onset. The pain is present in the right lower leg. The quality of the pain is described as aching. The pain is at a severity of 2/10. Pertinent negatives include no numbness, full range of motion, no stiffness and no tingling. The symptoms are aggravated by standing and contact. He has tried nothing for the symptoms. There has been no history of extremity trauma. Review of Systems   Musculoskeletal: Negative for stiffness. Neurological: Negative for tingling and numbness. Physical Exam  Musculoskeletal:        Legs:          ASSESSMENT and PLAN  Diagnoses and all orders for this visit:    1. Thrombophlebitis of superficial veins of right lower extremity  -     diclofenac EC (VOLTAREN) 75 mg EC tablet;  Take 1 Tab by mouth two (2) times a day for 7 days.  - Elevate, heat

## 2019-12-18 RX ORDER — LISINOPRIL 20 MG/1
TABLET ORAL
Qty: 30 TAB | Refills: 0 | Status: SHIPPED | OUTPATIENT
Start: 2019-12-18 | End: 2020-01-10

## 2019-12-18 NOTE — TELEPHONE ENCOUNTER
Please call patient. Seen in July, never had labs done.   Needs to have labs done ASAP to get further refills

## 2019-12-19 NOTE — TELEPHONE ENCOUNTER
Verified patient identity with two identifiers. Spoke with patient by phone. Reminded to get fasting labs done. He states he still has lab slip. Patient verbalized understanding.

## 2019-12-20 ENCOUNTER — OFFICE VISIT (OUTPATIENT)
Dept: INTERNAL MEDICINE CLINIC | Age: 74
End: 2019-12-20

## 2019-12-20 VITALS
RESPIRATION RATE: 18 BRPM | HEIGHT: 70 IN | TEMPERATURE: 97.9 F | OXYGEN SATURATION: 99 % | DIASTOLIC BLOOD PRESSURE: 80 MMHG | WEIGHT: 184 LBS | HEART RATE: 69 BPM | SYSTOLIC BLOOD PRESSURE: 154 MMHG | BODY MASS INDEX: 26.34 KG/M2

## 2019-12-20 DIAGNOSIS — J06.9 VIRAL URI: Primary | ICD-10-CM

## 2019-12-20 DIAGNOSIS — Z76.89 SLEEP CONCERN: ICD-10-CM

## 2019-12-20 RX ORDER — FAMOTIDINE 20 MG/1
20 TABLET, FILM COATED ORAL 2 TIMES DAILY
COMMUNITY

## 2019-12-20 NOTE — PATIENT INSTRUCTIONS

## 2019-12-20 NOTE — PROGRESS NOTES
Subjective:   Margie Connor is a 69 y.o. male who complains of productive cough and itchy throat and mild runny nose. for 2 days, gradually worsening since that time. He denies a history of fevers, shortness of breath and wheezing. He reports last summer when he saw his PCP there was a potential concern about lisinopril causing a cough. The cough he has today is not the same cough. He did not follow the trial off of his lisinopril replacing with Bystolic as he was instructed by his PCP Dr. Geovanny Harman. Not sleeping well   Evaluation to date: none. Treatment to date: OTC products. Patient does not smoke cigarettes. Relevant PMH: No pertinent additional PMH. Current Outpatient Medications   Medication Sig Dispense Refill    famotidine (PEPCID) 20 mg tablet Take 20 mg by mouth two (2) times a day.  lisinopril (PRINIVIL, ZESTRIL) 20 mg tablet 1 tab PO daily. Labs required prior to any further refills 30 Tab 0    febuxostat (ULORIC) 40 mg tab tablet Take 1 Tab by mouth daily. 90 Tab 1    multivitamin (ONE A DAY) tablet Take 1 Tab by mouth daily.  Omega-3 Fatty Acids (FISH OIL) 500 mg cap Take 1 Cap by mouth daily.  cholecalciferol (VITAMIN D3) 1,000 unit tablet Take  by mouth daily.  varicella-zoster recombinant, PF, (SHINGRIX, PF,) 50 mcg/0.5 mL susr injection 0.5 ml IM once and then repeat in 2-6 months. 0.5 mL 1     No Known Allergies     Review of Systems  Pertinent items are noted in HPI. Objective:     Visit Vitals  /80   Pulse 69   Temp 97.9 °F (36.6 °C) (Oral)   Resp 18   Ht 5' 9.5\" (1.765 m)   Wt 184 lb (83.5 kg)   SpO2 99%   BMI 26.78 kg/m²     General:  alert, cooperative, no distress   Eyes: negative   Ears: normal TM's and external ear canals AU   Sinuses: Nasal congestion, sinus nontender   Mouth:  normal findings: oropharynx pink & moist without lesions or evidence of thrush   Neck: supple, symmetrical, trachea midline and no adenopathy.    Heart: normal rate and regular rhythm. Lungs: clear to auscultation bilaterally   Abdomen: soft, non-tender. Bowel sounds normal. No masses,  no organomegaly        Assessment/Plan:   viral upper respiratory illness  Sleep concern  Suggested symptomatic OTC remedies. RTC prn. Discussed diagnosis and treatment of viral URIs. Discussed the importance of avoiding unnecessary antibiotic therapy. Instructed to make an appointment to follow-up with his PCP regarding his sleep concerns. Reviewed sleep health. Also recommend he follow previous instructions from his PCP regarding his blood pressure after resolution of viral upper respiratory infection. I have discussed the diagnosis with the patient and the intended plan as seen in the above orders. The patient has received an after-visit summary and questions were answered concerning future plans. I have discussed medication side effects and warnings with the patient as well. He has expressed understanding of the diagnosis and plan    Follow-up and Dispositions    · Return in about 4 weeks (around 1/17/2020), or if symptoms worsen or fail to improve. Aspects of this note may have been generated using voice recognition software.  Despite editing, there may be some syntax errors

## 2020-01-10 RX ORDER — LISINOPRIL 20 MG/1
TABLET ORAL
Qty: 7 TAB | Refills: 0 | Status: SHIPPED | OUTPATIENT
Start: 2020-01-10 | End: 2020-02-05 | Stop reason: SDUPTHER

## 2020-01-11 NOTE — TELEPHONE ENCOUNTER
Please call patient. never had labs done. 1 week sent in. If not done by next week will not refill medications. Pt notified of this last month.

## 2020-01-13 NOTE — TELEPHONE ENCOUNTER
Verified patient identity with two identifiers. Spoke with patient by phone. Pt states he was sick for about 3 weeks, he is out of town right now and will be back in a few days and will get them done, he has lab slip still and knows to be fasting.

## 2020-01-20 RX ORDER — LISINOPRIL 20 MG/1
TABLET ORAL
Qty: 7 TAB | Refills: 0 | OUTPATIENT
Start: 2020-01-20

## 2020-01-21 NOTE — TELEPHONE ENCOUNTER
Verified patient identity with two identifiers. Spoke with patient by phone. Informed to get labs done from July (fasting) prior to more refill. Patient verbalized understanding. He has lab slip.

## 2020-01-21 NOTE — TELEPHONE ENCOUNTER
7 days of medications sent to pharmacy last week. Instructed to get labs done prior to refills. Has not had labs done that were ordered in July.   Needs labs completed prior to refills

## 2020-02-05 LAB
ALB/GLOBRATIO, 58C: 2 (CALC) (ref 1–2.5)
ALBUMIN SERPL-MCNC: 4.1 G/DL (ref 3.6–5.1)
ALP SERPL-CCNC: 63 U/L (ref 35–144)
ALT SERPL-CCNC: 14 U/L (ref 9–46)
AST SERPL W P-5'-P-CCNC: 20 U/L (ref 10–35)
BILIRUB SERPL-MCNC: 0.9 MG/DL (ref 0.2–1.2)
BUN SERPL-MCNC: 16 MG/DL (ref 7–25)
BUN/CREATININE RATIO,BUCR: NORMAL (CALC) (ref 6–22)
CALCIUM SERPL-MCNC: 8.9 MG/DL (ref 8.6–10.3)
CHLORIDE SERPL-SCNC: 106 MMOL/L (ref 98–110)
CHOL/HDL RATIO,CHHDX: 3.2 (CALC)
CHOLEST SERPL-MCNC: 202 MG/DL
CO2 SERPL-SCNC: 28 MMOL/L (ref 20–32)
CREAT SERPL-MCNC: 0.84 MG/DL (ref 0.7–1.18)
ERYTHROCYTE [DISTWIDTH] IN BLOOD BY AUTOMATED COUNT: 12.9 % (ref 11–15)
GLOBULIN,GLOB: 2.1 G/DL (CALC) (ref 1.9–3.7)
GLUCOSE SERPL-MCNC: 87 MG/DL (ref 65–99)
HCT VFR BLD AUTO: 37.7 % (ref 38.5–50)
HDLC SERPL-MCNC: 64 MG/DL
HGB BLD-MCNC: 13.2 G/DL (ref 13.2–17.1)
LDL-CHOLESTEROL: 120 MG/DL (CALC)
MCH RBC QN AUTO: 31.1 PG (ref 27–33)
MCHC RBC AUTO-ENTMCNC: 35 G/DL (ref 32–36)
MCV RBC AUTO: 88.7 FL (ref 80–100)
NON-HDL CHOLESTEROL, 011976: 138 MG/DL (CALC)
PLATELET # BLD AUTO: 222 THOUSAND/UL (ref 140–400)
PMV BLD AUTO: 9.6 FL (ref 7.5–12.5)
POTASSIUM SERPL-SCNC: 4.3 MMOL/L (ref 3.5–5.3)
PROT SERPL-MCNC: 6.2 G/DL (ref 6.1–8.1)
PSA TOTAL,4108: <0.1 NG/ML
RBC # BLD AUTO: 4.25 MILLION/UL (ref 4.2–5.8)
SODIUM SERPL-SCNC: 141 MMOL/L (ref 135–146)
TRIGL SERPL-MCNC: 82 MG/DL (ref ?–150)
WBC # BLD AUTO: 4.3 THOUSAND/UL (ref 3.8–10.8)

## 2020-02-05 RX ORDER — LISINOPRIL 20 MG/1
TABLET ORAL
Qty: 90 TAB | Refills: 1 | Status: SHIPPED | OUTPATIENT
Start: 2020-02-05

## 2020-02-05 RX ORDER — FEBUXOSTAT 40 MG/1
40 TABLET, FILM COATED ORAL DAILY
Qty: 90 TAB | Refills: 1 | Status: SHIPPED | OUTPATIENT
Start: 2020-02-05

## 2020-02-06 NOTE — PROGRESS NOTES
Please call patient. All labs or testing are normal.  10 yr CVD risk is > 20% but will defer statin. Medications refilled. Due for f/u in July, needs to schedule now. Labs were ordered in July '19 and not completed until Feb '20.

## 2020-08-08 RX ORDER — LISINOPRIL 20 MG/1
TABLET ORAL
Qty: 90 TAB | Refills: 1 | OUTPATIENT
Start: 2020-08-08

## 2020-08-09 NOTE — TELEPHONE ENCOUNTER
Patient Care Team:  Joshua Castillo MD as PCP - General (Family Medicine)  Joshua Csatillo MD as PCP - [address] [place] [address]  [facility]  Getachew Larkin MD as Surgeon (General Surgery)

## 2022-03-05 NOTE — PATIENT INSTRUCTIONS
Sleep Medication Recommendations: 
 
Over the counter: 
Unisom (Doxylamine) Benadryl (Benadryl) Melatonin Prescriptions: 
Trazodone Learning About Sleeping Well What does sleeping well mean? Sleeping well means getting enough sleep. How much sleep is enough varies among people. The number of hours you sleep is not as important as how you feel when you wake up. If you do not feel refreshed, you probably need more sleep. Another sign of not getting enough sleep is feeling tired during the day. The average total nightly sleep time is 7½ to 8 hours. Healthy adults may need a little more or a little less than this. Why is getting enough sleep important? Getting enough quality sleep is a basic part of good health. When your sleep suffers, your mood and your thoughts can suffer too. You may find yourself feeling more grumpy or stressed. Not getting enough sleep also can lead to serious problems, including injury, accidents, anxiety, and depression. What might cause poor sleeping? Many things can cause sleep problems, including: · Stress. Stress can be caused by fear about a single event, such as giving a speech. Or you may have ongoing stress, such as worry about work or school. · Depression, anxiety, and other mental or emotional conditions. · Changes in your sleep habits or surroundings. This includes changes that happen where you sleep, such as noise, light, or sleeping in a different bed. It also includes changes in your sleep pattern, such as having jet lag or working a late shift. · Health problems, such as pain, breathing problems, and restless legs syndrome. · Lack of regular exercise. How can you help yourself? Here are some tips that may help you sleep more soundly and wake up feeling more refreshed. Your sleeping area · Use your bedroom only for sleeping and sex. A bit of light reading may help you fall asleep.  But if it doesn't, do your reading elsewhere in the house. Don't watch TV in bed. · Be sure your bed is big enough to stretch out comfortably, especially if you have a sleep partner. · Keep your bedroom quiet, dark, and cool. Use curtains, blinds, or a sleep mask to block out light. To block out noise, use earplugs, soothing music, or a \"white noise\" machine. Your evening and bedtime routine · Create a relaxing bedtime routine. You might want to take a warm shower or bath, listen to soothing music, or drink a cup of noncaffeinated tea. · Go to bed at the same time every night. And get up at the same time every morning, even if you feel tired. What to avoid · Limit caffeine (coffee, tea, caffeinated sodas) during the day, and don't have any for at least 4 to 6 hours before bedtime. · Don't drink alcohol before bedtime. Alcohol can cause you to wake up more often during the night. · Don't smoke or use tobacco, especially in the evening. Nicotine can keep you awake. · Don't take naps during the day, especially close to bedtime. · Don't lie in bed awake for too long. If you can't fall asleep, or if you wake up in the middle of the night and can't get back to sleep within 15 minutes or so, get out of bed and go to another room until you feel sleepy. · Don't take medicine right before bed that may keep you awake or make you feel hyper or energized. Your doctor can tell you if your medicine may do this and if you can take it earlier in the day. If you can't sleep · Imagine yourself in a peaceful, pleasant scene. Focus on the details and feelings of being in a place that is relaxing. · Get up and do a quiet or boring activity until you feel sleepy. · Don't drink any liquids after 6 p.m. if you wake up often because you have to go to the bathroom. Where can you learn more? Go to http://jose angel-jorden.info/. Enter D870 in the search box to learn more about \"Learning About Sleeping Well. \" Current as of: December 7, 2017 Content Version: 11.8 © 9623-4778 [employer]. Care instructions adapted under license by [employer] (which disclaims liability or warranty for this information). If you have questions about a medical condition or this instruction, always ask your healthcare professional. [address] any warranty or liability for your use of this information. There are no Wet Read(s) to document.

## 2022-03-18 PROBLEM — I10 HYPERTENSION, ESSENTIAL: Status: ACTIVE | Noted: 2018-11-16

## 2022-03-18 PROBLEM — K40.90 LEFT INGUINAL HERNIA: Status: ACTIVE | Noted: 2019-07-05

## 2022-03-19 PROBLEM — K21.9 GASTROESOPHAGEAL REFLUX DISEASE WITHOUT ESOPHAGITIS: Status: ACTIVE | Noted: 2018-11-16

## 2022-03-19 PROBLEM — Z87.39 H/O: GOUT: Status: ACTIVE | Noted: 2018-11-16

## 2023-05-18 RX ORDER — LISINOPRIL 20 MG/1
1 TABLET ORAL DAILY
COMMUNITY
Start: 2020-02-05

## 2023-05-18 RX ORDER — FEBUXOSTAT 40 MG/1
40 TABLET, FILM COATED ORAL DAILY
COMMUNITY
Start: 2020-02-05

## 2023-05-18 RX ORDER — FAMOTIDINE 20 MG/1
20 TABLET, FILM COATED ORAL 2 TIMES DAILY
COMMUNITY